# Patient Record
Sex: FEMALE | Race: WHITE | Employment: OTHER | URBAN - METROPOLITAN AREA
[De-identification: names, ages, dates, MRNs, and addresses within clinical notes are randomized per-mention and may not be internally consistent; named-entity substitution may affect disease eponyms.]

---

## 2017-01-16 ENCOUNTER — OFFICE VISIT (OUTPATIENT)
Dept: CARDIOLOGY CLINIC | Age: 82
End: 2017-01-16

## 2017-01-16 DIAGNOSIS — Z95.0 CARDIAC PACEMAKER IN SITU: Primary | ICD-10-CM

## 2017-04-18 ENCOUNTER — OFFICE VISIT (OUTPATIENT)
Dept: CARDIOLOGY CLINIC | Age: 82
End: 2017-04-18

## 2017-04-18 DIAGNOSIS — Z95.0 CARDIAC PACEMAKER IN SITU: Primary | ICD-10-CM

## 2017-04-23 RX ORDER — HYDROCHLOROTHIAZIDE 12.5 MG/1
CAPSULE ORAL
Qty: 90 CAP | Refills: 0 | Status: SHIPPED | OUTPATIENT
Start: 2017-04-23 | End: 2017-07-22 | Stop reason: SDUPTHER

## 2017-04-26 RX ORDER — MEMANTINE HYDROCHLORIDE 28 MG/1
CAPSULE, EXTENDED RELEASE ORAL
Qty: 90 CAP | Refills: 0 | Status: SHIPPED | OUTPATIENT
Start: 2017-04-26 | End: 2017-07-25 | Stop reason: SDUPTHER

## 2017-05-04 RX ORDER — DONEPEZIL HYDROCHLORIDE 10 MG/1
TABLET, FILM COATED ORAL
Qty: 90 TAB | Refills: 0 | Status: SHIPPED | OUTPATIENT
Start: 2017-05-04 | End: 2017-08-02 | Stop reason: SDUPTHER

## 2017-05-24 ENCOUNTER — OFFICE VISIT (OUTPATIENT)
Dept: NEUROLOGY | Age: 82
End: 2017-05-24

## 2017-05-24 VITALS
DIASTOLIC BLOOD PRESSURE: 62 MMHG | WEIGHT: 111.6 LBS | OXYGEN SATURATION: 97 % | SYSTOLIC BLOOD PRESSURE: 104 MMHG | BODY MASS INDEX: 17.52 KG/M2 | TEMPERATURE: 97.8 F | RESPIRATION RATE: 16 BRPM | HEART RATE: 83 BPM | HEIGHT: 67 IN

## 2017-05-24 DIAGNOSIS — F02.80 ALZHEIMER'S DISEASE OF OTHER ONSET WITHOUT BEHAVIORAL DISTURBANCE: Primary | ICD-10-CM

## 2017-05-24 DIAGNOSIS — G30.8 ALZHEIMER'S DISEASE OF OTHER ONSET WITHOUT BEHAVIORAL DISTURBANCE: Primary | ICD-10-CM

## 2017-05-24 NOTE — PROGRESS NOTES
Follow up for memory loss. Cousin in with patient and reports significant decline in patient's memory since last visit. Patient had 11 lb weight loss since last visit.

## 2017-05-24 NOTE — MR AVS SNAPSHOT
Visit Information Date & Time Provider Department Dept. Phone Encounter #  
 5/24/2017 11:20 AM Craig Saucedo MD [facility] [id number] Follow-up Instructions Return in about 6 months (around 11/24/2017). Your Appointments 7/28/2017 11:00 AM  
PACEMAKER with PACEMAKER3 [facility] (LAUREN SCHEDULING) Appt Note: bio threshold/rc/Jewel 1 yr b  new   
 [address] [place] [address]  
[address] [facility]  
  
    
 7/28/2017 11:20 AM  
ESTABLISHED PATIENT with Jossue Cortez MD  
[facility] ([address]) Appt Note: bio threshold/rc/Jewel 1 yr b  new   
 [address] [place] [address]  
[address] [facility] [id number] Upcoming Health Maintenance Date Due DTaP/Tdap/Td series (1 - Tdap) 7/3/1954 Pneumococcal 65+ Low/Medium Risk (2 of 2 - PPSV23) 1/2/2005 GLAUCOMA SCREENING Q2Y 9/1/2015 MEDICARE YEARLY EXAM 4/22/2017 INFLUENZA AGE 9 TO ADULT 8/1/2017 Allergies as of 5/24/2017  Review Complete On: 5/24/2017 By: Craig Saucedo MD  
 No Known Allergies Current Immunizations  Reviewed on 2/27/2014 Name Date Influenza Vaccine Split 11/17/2011 Pneumococcal Vaccine (Unspecified Type) 1/2/2000 TB Skin Test (PPD) Intradermal 12/16/2013 Zoster 1/2/2010 Not reviewed this visit Vitals BP Pulse Temp Resp Height(growth percentile) Weight(growth percentile) 104/62 83 97.8 °F (36.6 °C) 16 5' 7\" (1.702 m) 111 lb 9.6 oz (50.6 kg) SpO2 BMI OB Status Smoking Status 97% 17.48 kg/m2 Postmenopausal Former Smoker Vitals History BMI and BSA Data Body Mass Index Body Surface Area  
 17.48 kg/m 2 1.55 m 2 [phone] Your Updated Medication List  
  
   
This list is accurate as of: 5/24/17 11:58 AM.  Always use your most recent med list.  
  
  
  
  
 aspirin 81 mg tablet Take 81 mg by mouth daily. donepezil 10 mg tablet Commonly known as:  ARICEPT  
TAKE 1 TABLET DAILY  
  
 hydroCHLOROthiazide 12.5 mg capsule Commonly known as:  Loan Greener TAKE 1 CAPSULE DAILY  
  
 lisinopril 20 mg tablet Commonly known as:  PRINIVIL, ZESTRIL  
TAKE ONE TABLET EVERY DAY  
  
 NAMENDA XR 28 mg capsule Generic drug:  memantine ER  
TAKE 1 CAPSULE DAILY Follow-up Instructions Return in about 6 months (around 11/24/2017). Patient Instructions Information Regarding Testing If you have physican order for a test or a medication denied by your insurance company, this does not mean the test or medication is not appropriate for you as that is a medical decision, not a decision to be made by an insurance company representative or by an [facility] physician who has not interviewed and examined you. This is a decision to be made between you and your physician. The denial of services is a contractual matter between you and your insurance company, not an issue between your physician and the insurance company. If your test or medication is denied, you can take the following steps to help resolve the issue: 1. File a complaint with the [facility] regarding your insurance company's denial of services ordered for you. You can do this either by calling them directly or by completing an on-line complaint form on the [employer]You[employer]. This can be found at www.[place].gov 2.    Also file a formal complaint with your insurance company and ask to have the name of the person denying the service so that you may explore a legal option should you be harmed by this denial of service. Again, the fact the insurance company will not pay for the service does not mean it is not medically necessary and I would encourage you to follow through with the plan that was made with your physician 3. File a written complaint with your employer so your employer and benefit manager is aware of the poor coverage they are providing their employees. If you have medicare/medicaid, complain to your representative in the House and to your Kerri Foley. PRESCRIPTION REFILL POLICY [address] [facility] Statement to Patients April 1, 2014 In an effort to ensure the large volume of patient prescription refills is processed in the most efficient and expeditious manner, we are asking our patients to assist us by calling your Pharmacy for all prescription refills, this will include also your  Mail Order Pharmacy. The pharmacy will contact our office electronically to continue the refill process. Please do not wait until the last minute to call your pharmacy. We need at least 48 hours (2days) to fill prescriptions. We also encourage you to call your pharmacy before going to  your prescription to make sure it is ready. With regard to controlled substance prescription refill requests (narcotic refills) that need to be picked up at our office, we ask your cooperation by providing us with at least 72 hours (3days) notice that you will need a refill. We will not refill narcotic prescription refill requests after 4:00pm on any weekday, Monday through Thursday, or after 2:00pm on Fridays, or on the weekends. We encourage everyone to explore another way of getting your prescription refill request processed using [employer], our patient web portal through our electronic medical record system.  MuseStormt is an efficient and effective way to communicate your medication request directly to the office and downloadable as an anselmo on your smart phone . [employer] also features a review functionality that allows you to view your medication list as well as leave messages for your physician. Are you ready to get connected? If so please review the attatched instructions or speak to any of our staff to get you set up right away! Thank you so much for your cooperation. Should you have any questions please contact our Practice Administrator. The Physicians and Staff,  [facility] Neurology [facility] If we have ordered testing for you, we do not call patients with results and we do not give test results over the phone. We schedule follow up appointments so that your results can be discussed in person and any questions you have regarding them may be addressed. If something of concern is revealed on your test, we will call you for a sooner follow up appointment. Additionally, results may be found by using the My Chart feature and one of our patient service representatives at the  can give you instructions on how to access this feature of our electronic medical record system. Angus Alcocer 1973 What is a living will? A living will is a legal form you use to write down the kind of care you want at the end of your life. It is used by the health professionals who will treat you if you aren't able to decide for yourself. If you put your wishes in writing, your loved ones and others will know what kind of care you want. They won't need to guess. This can ease your mind and be helpful to others. A living will is not the same as an estate or property will. An estate will explains what you want to happen with your money and property after you die. Is a living will a legal document? A living will is a legal document. Each state has its own laws about living prado. If you move to another state, make sure that your living will is legal in the state where you now live.  Or you might use a universal form that has been approved by many states. This kind of form can sometimes be completed and stored online. Your electronic copy will then be available wherever you have a connection to the Internet. In most cases, doctors will respect your wishes even if you have a form from a different state. · You don't need an  to complete a living will. But legal advice can be helpful if your state's laws are unclear, your health history is complicated, or your family can't agree on what should be in your living will. · You can change your living will at any time. Some people find that their wishes about end-of-life care change as their health changes. · In addition to making a living will, think about completing a medical power of  form. This form lets you name the person you want to make end-of-life treatment decisions for you (your \"health care agent\") if you're not able to. Many hospitals and nursing homes will give you the forms you need to complete a living will and a medical power of . · Your living will is used only if you can't make or communicate decisions for yourself anymore. If you become able to make decisions again, you can accept or refuse any treatment, no matter what you wrote in your living will. · Your state may offer an online registry. This is a place where you can store your living will online so the doctors and nurses who need to treat you can find it right away. What should you think about when creating a living will? Talk about your end-of-life wishes with your family members and your doctor. Let them know what you want. That way the people making decisions for you won't be surprised by your choices. Think about these questions as you make your living will: · Do you know enough about life support methods that might be used?  If not, talk to your doctor so you know what might be done if you can't breathe on your own, your heart stops, or you're unable to swallow. · What things would you still want to be able to do after you receive life-support methods? Would you want to be able to walk? To speak? To eat on your own? To live without the help of machines? · If you have a choice, where do you want to be cared for? In your home? At a hospital or nursing home? · Do you want certain Roman Catholic practices performed if you become very ill? · If you have a choice at the end of your life, where would you prefer to die? At home? In a hospital or nursing home? Somewhere else? · Would you prefer to be buried or cremated? · Do you want your organs to be donated after you die? What should you do with your living will? · Make sure that your family members and your health care agent have copies of your living will. · Give your doctor a copy of your living will to keep in your medical record. If you have more than one doctor, make sure that each one has a copy. · You may want to put a copy of your living will where it can be easily found. Where can you learn more? Go to http://rogelio-asa.info/. Enter H821 in the search box to learn more about \"Learning About Living Flaquito Taylor. \" Current as of: February 24, 2016 Content Version: 11.2 © 1781-1575 [employer]. Care instructions adapted under license by [employer] (which disclaims liability or warranty for this information). If you have questions about a medical condition or this instruction, always ask your healthcare professional. [place] [address] any warranty or liability for your use of this information. Introducing [facility]! Dear Dorothy Schwartz: Thank you for requesting a [employer] account. Our records indicate that you have previously registered for a [employer] account but its currently inactive. Please call our [employer] support line at [phone]. Additional Information If you have questions, please visit the Frequently Asked Questions section of the Teamlyhart website at [email or web address] Remember, [employer] is NOT to be used for urgent needs. For medical emergencies, dial 911. Now available from your iPhone and Android! Please provide this summary of care documentation to your next provider. Your primary care clinician is listed as Fern Morrison. If you have any questions after today's visit, please call [phone].

## 2017-05-24 NOTE — PROGRESS NOTES
[facility]   [place] [address]   Aisha Phipps 57    [facility]    Fax               No chief complaint on file. Current Outpatient Prescriptions   Medication Sig Dispense Refill    donepezil (ARICEPT) 10 mg tablet TAKE 1 TABLET DAILY 90 Tab 0    NAMENDA XR 28 mg capsule TAKE 1 CAPSULE DAILY 90 Cap 0    hydroCHLOROthiazide (MICROZIDE) 12.5 mg capsule TAKE 1 CAPSULE DAILY 90 Cap 0    lisinopril (PRINIVIL, ZESTRIL) 20 mg tablet TAKE ONE TABLET EVERY DAY 90 Tab 1    aspirin 81 mg tablet Take 81 mg by mouth daily. No Known Allergies  Social History   Substance Use Topics    Smoking status: Former Smoker     Quit date: 11/17/1975    Smokeless tobacco: Never Used    Alcohol use 7.0 oz/week     14 Glasses of wine per week     Patient returns today for follow-up dementia, Alzheimer's type. She has been maintained on Namenda as well as Aricept. She has been tolerating her medications without difficulty. There have been no dangerous behaviors. She continues to walk the grounds of the facility. She is not eating much. She actually orders half portions of her meals. She says that she just is not hungry. Her niece is with her. She has noticed a dramatic decline. There is been no fall. There is been no injury. The patient has withdrawn herself from her longtime friends from the [address] [place] [address] Str.. Examination  Visit Vitals    Resp 16    Ht 5' 7\" (1.702 m)    Wt 50.6 kg (111 lb 9.6 oz)    BMI 17.48 kg/m2     She is pleasant. She is very well dressed. She has quite appropriate grooming. She has no icterus. No edema. She is oriented to May 25, 2017. She says the president is Trump. She follows commands. She will repeat the same things several times. Impression/Plan  Dementia of the Alzheimer's type the continues to decline. Discussed today with the patient as well as her niece the continued decline. Discussed strategies to try to get her to eat more. Her niece will be going to get snacks etc. for the patient to keep in her room. She does stay physically active walking about the campus most days. We discussed strategies but most of all expectations and that it is not out of the ordinary to see that the stabilization we were enjoying with the medications is now gone into a decline. Answered the niece's questions today and tried to explain this as best I could and to really get her to understand that there is not really anything else we can do. Continue with current regimen    Total time: 30 min   Counseling / coordination time: 20 min   > 50% counseling / coordination?: Yes re: as documented    This note was created using voice recognition software. Despite editing, there may be syntax errors. This note will not be viewable in [address]

## 2017-05-24 NOTE — PATIENT INSTRUCTIONS
Information Regarding Testing     If you have physican order for a test or a medication denied by your insurance company, this does not mean the test or medication is not appropriate for you as that is a medical decision, not a decision to be made by an insurance company representative or by an [facility] physician who has not interviewed and examined you. This is a decision to be made between you and your physician. The denial of services is a contractual matter between you and your insurance company, not an issue between your physician and the insurance company. If your test or medication is denied, you can take the following steps to help resolve the issue:    1. File a complaint with the [facility] regarding your insurance company's denial of services ordered for you. You can do this either by calling them directly or by completing an on-line complaint form on the [employer]. This can be found at [email or web address]    2. Also file a formal complaint with your insurance company and ask to have the name of the person denying the service so that you may explore a legal option should you be harmed by this denial of service. Again, the fact the insurance company will not pay for the service does not mean it is not medically necessary and I would encourage you to follow through with the plan that was made with your physician    3. File a written complaint with your employer so your employer and benefit manager is aware of the poor coverage they are providing their employees. If you have medicare/medicaid, complain to your representative in the House and to your Kerri Foley.     [facility]   Statement to Patients  April 1, 2014      In an effort to ensure the large volume of patient prescription refills is processed in the most efficient and expeditious manner, we are asking our patients to assist us by calling your Pharmacy for all prescription refills, this will include also your  Mail Order Pharmacy. The pharmacy will contact our office electronically to continue the refill process. Please do not wait until the last minute to call your pharmacy. We need at least 48 hours (2days) to fill prescriptions. We also encourage you to call your pharmacy before going to  your prescription to make sure it is ready. With regard to controlled substance prescription refill requests (narcotic refills) that need to be picked up at our office, we ask your cooperation by providing us with at least 72 hours (3days) notice that you will need a refill. We will not refill narcotic prescription refill requests after 4:00pm on any weekday, Monday through Thursday, or after 2:00pm on Fridays, or on the weekends. We encourage everyone to explore another way of getting your prescription refill request processed using Gr[employer], our patient web portal through our electronic medical record system. Grou[employer]Y is an efficient and effective way to communicate your medication request directly to the office and  downloadable as an anselmo on your smart phone . [employer] also features a review functionality that allows you to view your medication list as well as leave messages for your physician. Are you ready to get connected? If so please review the attatched instructions or speak to any of our staff to get you set up right away! Thank you so much for your cooperation. Should you have any questions please contact our Practice Administrator. The Physicians and Staff,  [facility]       If we have ordered testing for you, we do not call patients with results and we do not give test results over the phone. We schedule follow up appointments so that your results can be discussed in person and any questions you have regarding them may be addressed.   If something of concern is revealed on your test, we will call you for a sooner follow up appointment. Additionally, results may be found by using the My Chart feature and one of our patient service representatives at the  can give you instructions on how to access this feature of our electronic medical record system. Learning About Living Corina Wheeler  What is a living will? A living will is a legal form you use to write down the kind of care you want at the end of your life. It is used by the health professionals who will treat you if you aren't able to decide for yourself. If you put your wishes in writing, your loved ones and others will know what kind of care you want. They won't need to guess. This can ease your mind and be helpful to others. A living will is not the same as an estate or property will. An estate will explains what you want to happen with your money and property after you die. Is a living will a legal document? A living will is a legal document. Each state has its own laws about living prado. If you move to another state, make sure that your living will is legal in the state where you now live. Or you might use a universal form that has been approved by many states. This kind of form can sometimes be completed and stored online. Your electronic copy will then be available wherever you have a connection to the Internet. In most cases, doctors will respect your wishes even if you have a form from a different state. · You don't need an  to complete a living will. But legal advice can be helpful if your state's laws are unclear, your health history is complicated, or your family can't agree on what should be in your living will. · You can change your living will at any time. Some people find that their wishes about end-of-life care change as their health changes. · In addition to making a living will, think about completing a medical power of  form.  This form lets you name the person you want to make end-of-life treatment decisions for you (your \"health care agent\") if you're not able to. Many hospitals and nursing homes will give you the forms you need to complete a living will and a medical power of . · Your living will is used only if you can't make or communicate decisions for yourself anymore. If you become able to make decisions again, you can accept or refuse any treatment, no matter what you wrote in your living will. · Your state may offer an online registry. This is a place where you can store your living will online so the doctors and nurses who need to treat you can find it right away. What should you think about when creating a living will? Talk about your end-of-life wishes with your family members and your doctor. Let them know what you want. That way the people making decisions for you won't be surprised by your choices. Think about these questions as you make your living will:  · Do you know enough about life support methods that might be used? If not, talk to your doctor so you know what might be done if you can't breathe on your own, your heart stops, or you're unable to swallow. · What things would you still want to be able to do after you receive life-support methods? Would you want to be able to walk? To speak? To eat on your own? To live without the help of machines? · If you have a choice, where do you want to be cared for? In your home? At a hospital or nursing home? · Do you want certain Lutheran practices performed if you become very ill? · If you have a choice at the end of your life, where would you prefer to die? At home? In a hospital or nursing home? Somewhere else? · Would you prefer to be buried or cremated? · Do you want your organs to be donated after you die? What should you do with your living will? · Make sure that your family members and your health care agent have copies of your living will. · Give your doctor a copy of your living will to keep in your medical record.  If you have more than one doctor, make sure that each one has a copy. · You may want to put a copy of your living will where it can be easily found. Where can you learn more? Go to http://rogelio-asa.info/. Enter L458 in the search box to learn more about \"Learning About Living [place]. \"  Current as of: February 24, 2016  Content Version: 11.2  © 8906-3282 [employer]eedMe. Care instructions adapted under license by GuestSpan (which disclaims liability or warranty for this information). If you have questions about a medical condition or this instruction, always ask your healthcare professional. [address] any warranty or liability for your use of this information.

## 2017-05-26 RX ORDER — LISINOPRIL 20 MG/1
TABLET ORAL
Qty: 90 TAB | Refills: 0 | Status: SHIPPED | OUTPATIENT
Start: 2017-05-26 | End: 2017-08-24 | Stop reason: SDUPTHER

## 2017-07-22 RX ORDER — HYDROCHLOROTHIAZIDE 12.5 MG/1
CAPSULE ORAL
Qty: 90 CAP | Refills: 1 | Status: SHIPPED | OUTPATIENT
Start: 2017-07-22 | End: 2018-01-21 | Stop reason: SDUPTHER

## 2017-07-25 RX ORDER — MEMANTINE HYDROCHLORIDE 28 MG/1
CAPSULE, EXTENDED RELEASE ORAL
Qty: 90 CAP | Refills: 3 | Status: SHIPPED | OUTPATIENT
Start: 2017-07-25

## 2017-08-04 RX ORDER — DONEPEZIL HYDROCHLORIDE 10 MG/1
TABLET, FILM COATED ORAL
Qty: 90 TAB | Refills: 0 | Status: SHIPPED | OUTPATIENT
Start: 2017-08-04 | End: 2017-11-02 | Stop reason: SDUPTHER

## 2017-08-24 RX ORDER — LISINOPRIL 20 MG/1
TABLET ORAL
Qty: 90 TAB | Refills: 1 | Status: SHIPPED | OUTPATIENT
Start: 2017-08-24

## 2017-08-29 ENCOUNTER — OFFICE VISIT (OUTPATIENT)
Dept: CARDIOLOGY CLINIC | Age: 82
End: 2017-08-29

## 2017-08-29 ENCOUNTER — CLINICAL SUPPORT (OUTPATIENT)
Dept: CARDIOLOGY CLINIC | Age: 82
End: 2017-08-29

## 2017-08-29 VITALS
DIASTOLIC BLOOD PRESSURE: 60 MMHG | HEART RATE: 76 BPM | BODY MASS INDEX: 17.67 KG/M2 | SYSTOLIC BLOOD PRESSURE: 122 MMHG | WEIGHT: 112.8 LBS

## 2017-08-29 DIAGNOSIS — Z95.0 CARDIAC PACEMAKER IN SITU: ICD-10-CM

## 2017-08-29 DIAGNOSIS — I10 ESSENTIAL HYPERTENSION: ICD-10-CM

## 2017-08-29 DIAGNOSIS — Z95.0 CARDIAC PACEMAKER IN SITU: Primary | ICD-10-CM

## 2017-08-29 DIAGNOSIS — I44.2 THIRD DEGREE ATRIOVENTRICULAR BLOCK (HCC): Primary | ICD-10-CM

## 2017-08-29 NOTE — PROGRESS NOTES
HISTORY OF PRESENT ILLNESS  Jason Red is a 80 y.o. female     SUMMARY:   Problem List  Date Reviewed: 8/28/2017          Codes Class Noted    Alzheimer's disease ICD-10-CM: G30.9  ICD-9-CM: 331.0  4/26/2014        S/P placement of cardiac pacemaker ICD-10-CM: Z95.0  ICD-9-CM: V45.01  5/28/2013    Overview Addendum 3/30/2015  7:44 AM by Ramírez Albarado MD     5/28/13 dual chamber Biotronik pacemaker  5/13 echo normal lvef, lae, mild mr, tr             Third degree atrioventricular block ([address] ICD-10-CM: I44.2  ICD-9-CM: 426.0  5/28/2013        H/O seasonal allergies ICD-10-CM: Z88.9  ICD-9-CM: V15.09  5/17/2013        Memory loss ICD-10-CM: R41.3  ICD-9-CM: 780.93  8/2/2012        Essential hypertension ICD-10-CM: I10  ICD-9-CM: 401.9  11/17/2011        Family history of malignant neoplasm of colon ICD-10-CM: Z80.0  ICD-9-CM: V16.0  11/17/2011              Current Outpatient Prescriptions on File Prior to Visit   Medication Sig    lisinopril (PRINIVIL, ZESTRIL) 20 mg tablet TAKE 1 TABLET DAILY    donepezil (ARICEPT) 10 mg tablet TAKE 1 TABLET DAILY    NAMENDA XR 28 mg capsule TAKE 1 CAPSULE DAILY    hydroCHLOROthiazide (MICROZIDE) 12.5 mg capsule TAKE 1 CAPSULE DAILY    aspirin 81 mg tablet Take 81 mg by mouth daily. No current facility-administered medications on file prior to visit. CARDIOLOGY STUDIES TO DATE:  5/28/13 dual chamber Biotronik pacemaker  5/13 echo normal lvef, lae, mild mr, tr        Chief Complaint   Patient presents with    Hypertension     HPI :  Ms. Villa Carlos is doing well. She is still very active walking at [facility] with no worrisome cardiac symptoms. She is not eating well, says that is because of her teeth, and she has lost about ten pounds since we saw her last July. She had a pacemaker check today.      CARDIAC ROS:   negative for chest pain, dyspnea, palpitations, syncope, orthopnea, paroxysmal nocturnal dyspnea, exertional chest pressure/discomfort, claudication, lower extremity edema    Family History   Problem Relation Age of Onset    Cancer Mother     Heart Disease Mother     Hypertension Mother     Stroke Mother     Diabetes Father     Cancer Father     Heart Disease Father     Psychiatric Disorder Brother     Alcohol abuse Paternal Uncle        Past Medical History:   Diagnosis Date    Abnormal weight loss     Allergic rhinitis     Cancer ([address]     skin    Dementia 8/15/2013    Family history of malignant neoplasm of colon 11/17/2011    H/O seasonal allergies 5/17/2013    Hypertension     Memory disorder     Pacemaker        GENERAL ROS:  A comprehensive review of systems was negative except for that written in the HPI. Visit Vitals    /60 (BP 1 Location: Left arm, BP Patient Position: Sitting)    Pulse 76    Wt 112 lb 12.8 oz (51.2 kg)    BMI 17.67 kg/m2       Wt Readings from Last 3 Encounters:   08/29/17 112 lb 12.8 oz (51.2 kg)   05/24/17 111 lb 9.6 oz (50.6 kg)   09/08/16 122 lb 8 oz (55.6 kg)            BP Readings from Last 3 Encounters:   08/29/17 122/60   05/24/17 104/62   09/08/16 110/64       PHYSICAL EXAM  General appearance: alert, cooperative, no distress, appears stated age  Neck: supple, symmetrical, trachea midline, no adenopathy, no carotid bruit and no JVD  Lungs: clear to auscultation bilaterally  Heart: regular rate and rhythm, S1, S2 normal, no murmur, click, rub or gallop  Extremities: extremities normal, atraumatic, no cyanosis or edema    Lab Results   Component Value Date/Time    Cholesterol, total 226 11/28/2011 08:55 AM    HDL Cholesterol 75 11/28/2011 08:55 AM    LDL, calculated 132 11/28/2011 08:55 AM    Triglyceride 95 11/28/2011 08:55 AM     ASSESSMENT  Ms. Sandrine Paula is stable, asymptomatic and well-compensated on a good medical regimen and needs no cardiac testing at this time.       current treatment plan is effective, no change in therapy  lab results and schedule of future lab studies reviewed with patient  reviewed diet, exercise and weight control    Encounter Diagnoses   Name Primary?  Third degree atrioventricular block ([address] Yes    Essential hypertension     Cardiac pacemaker in situ      No orders of the defined types were placed in this encounter. Follow-up Disposition:  Return in about 1 year (around 8/29/2018).     Seun Crawford MD  8/29/2017

## 2017-08-29 NOTE — MR AVS SNAPSHOT
Visit Information Date & Time Provider Department Dept. Phone Encounter #  
 8/29/2017  2:40 PM Amita Mcneill MD [facility] [phone] [id number] Follow-up Instructions Return in about 1 year (around 8/29/2018). Your Appointments 8/29/2017  2:15 PM  
PACEMAKER with PACEMAKER3 [facility] ([facility]UREN SCHEDULING) Appt Note: bio threshold/rc/Jewel 1 yr b  new HM; bio threshold/rc/Jewel 1 yr b new HM pt rs 7.28.17 [address] [place] [address]  
[address] [facility]  
  
    
 8/29/2017  2:40 PM  
ESTABLISHED PATIENT with Amita Mcneill MD  
[facility] ([address]) Appt Note: bio threshold/rc/Jewel 1 yr b new HM pt rs 7.28.17 [address] [place] [address] [facility] [address]  
[address] [address]  
  
    
 12/6/2017  9:40 AM  
Follow Up with Roe Lam MD  
[facility]) Appt Note: memory loss brook  
 [facility] [address] Counts include 234 beds at the [facility] [address] [id number] [phone]  
  
   
 [facility]  
  
    
  
 12/6/2017 10:00 AM  
REMOTE OFFICE VISIT with Yuriy Lubin [facility] (LAUREN SCHEDULING) Appt Note: bio ppi, rc, b 8/29/17  
 [address] [facility] [address] [place] [address]  
[address] [address]  
  
    
 3/14/2018  9:00 AM  
REMOTE OFFICE VISIT with Yuriy Lubin [facility] ([facility]UREN SCHEDULING) Appt Note: bio ppi, rc  
 [address] [facility] [address] [place] [address]  
[phone]  
  
    
 6/20/2018  9:15 AM  
REMOTE OFFICE VISIT with Yuriy Lubin [facility] ([facility] SCHEDULING) Appt Note: bio ppi, rc  
 [address] [facility]  
[phone] Upcoming Health Maintenance Date Due DTaP/Tdap/Td series (1 - Tdap) 7/3/1954 Pneumococcal 65+ Low/Medium Risk (2 of 2 - PPSV23) 1/2/2005 GLAUCOMA SCREENING Q2Y 9/1/2015 MEDICARE YEARLY EXAM 4/22/2017 INFLUENZA AGE 9 TO ADULT 8/1/2017 Allergies as of 8/29/2017  Review Complete On: 8/29/2017 By: Rebbeca Lesch, MD  
 No Known Allergies Current Immunizations  Reviewed on 2/27/2014 Name Date Influenza Vaccine Split 11/17/2011 TB Skin Test (PPD) Intradermal 12/16/2013 ZZZ-RETIRED (DO NOT USE) Pneumococcal Vaccine (Unspecified Type) 1/2/2000 Zoster 1/2/2010 Not reviewed this visit You Were Diagnosed With   
  
 Codes Comments Third degree atrioventricular block (HCC)    -  Primary ICD-10-CM: C15.0 ICD-9-CM: 426.0 Essential hypertension     ICD-10-CM: I10 
ICD-9-CM: 401.9 Cardiac pacemaker in situ     ICD-10-CM: Z95.0 ICD-9-CM: V45.01 Vitals BP Pulse Weight(growth percentile) BMI OB Status Smoking Status 122/60 (BP 1 Location: Left arm, BP Patient Position: Sitting) 76 112 lb 12.8 oz (51.2 kg) 17.67 kg/m2 Postmenopausal Former Smoker Vitals History BMI and BSA Data Body Mass Index Body Surface Area  
 17.67 kg/m 2 1.56 m 2 Preferred Pharmacy Pharmacy Name Phone [address] [facility] [phone] Your Updated Medication List  
  
   
This list is accurate as of: 8/29/17  2:12 PM.  Always use your most recent med list.  
  
  
  
  
 aspirin 81 mg tablet Take 81 mg by mouth daily. donepezil 10 mg tablet Commonly known as:  ARICEPT  
TAKE 1 TABLET DAILY  
  
 hydroCHLOROthiazide 12.5 mg capsule Commonly known as:  Louis Devoid TAKE 1 CAPSULE DAILY  
  
 lisinopril 20 mg tablet Commonly known as:  PRINIVIL, ZESTRIL  
TAKE 1 TABLET DAILY NAMENDA XR 28 mg capsule Generic drug:  memantine ER  
TAKE 1 CAPSULE DAILY Follow-up Instructions Return in about 1 year (around 8/29/2018). Introducing [facility] SERVICES! Dear Kojo Hargrove: Thank you for requesting a Goodpatch account. Our records indicate that you have previously registered for a Goodpatch account but its currently inactive. Please call our Goodpatch support line at [phone]. Additional Information If you have questions, please visit the Frequently Asked Questions section of the Goodpatch website at [email or web address] Remember, Goodpatch is NOT to be used for urgent needs. For medical emergencies, dial 911. Now available from your iPhone and Android! Please provide this summary of care documentation to your next provider. Your primary care clinician is listed as Fred Aleman. If you have any questions after today's visit, please call [phone].

## 2017-11-02 RX ORDER — DONEPEZIL HYDROCHLORIDE 10 MG/1
TABLET, FILM COATED ORAL
Qty: 90 TAB | Refills: 0 | Status: SHIPPED | OUTPATIENT
Start: 2017-11-02

## 2017-12-06 ENCOUNTER — OFFICE VISIT (OUTPATIENT)
Dept: CARDIOLOGY CLINIC | Age: 82
End: 2017-12-06

## 2017-12-06 DIAGNOSIS — Z95.0 CARDIAC PACEMAKER IN SITU: Primary | ICD-10-CM

## 2017-12-13 ENCOUNTER — TELEPHONE (OUTPATIENT)
Dept: INTERNAL MEDICINE CLINIC | Age: 82
End: 2017-12-13

## 2017-12-13 NOTE — TELEPHONE ENCOUNTER
Joana Cid called stating that pt is having some memory lost and cognitive issues. Can pts has order for neuro/pysch eval. To help with pts asst living arrangements.      D:[phone]    Call back if neded

## 2017-12-15 NOTE — TELEPHONE ENCOUNTER
Fax sent to [facility] per Dr Hannah Caban of the following:  She is followed by Diane Kim (neurology) for dementia. He has done all the evaluations and refers any questions about this to him.

## 2017-12-19 ENCOUNTER — TELEPHONE (OUTPATIENT)
Dept: NEUROLOGY | Age: 82
End: 2017-12-19

## 2017-12-19 NOTE — TELEPHONE ENCOUNTER
----- Message from Geoff Morel sent at 12/19/2017 10:58 AM EST -----  Regarding: Dr. Morenita Ashley, a nurse from [facility], called stating that the pt's [address] wants the pt's to have a neuro physic test done there. Mrs. Shanice Echeverria is requesting for Dr. Leah Roth to send a order over so the pt can have it done at [facility] and they need it by 12-. Also Mrs. Shanice Echeverria is waiting on a response on the paperwork they sent over regarding the pt. Mrs. Shanika Vanegas best contact number is [phone], fax [phone].

## 2017-12-19 NOTE — TELEPHONE ENCOUNTER
Message not returned as caller or unknown POA not on HIPPA. Hortencia Munoz with neuropsych has paper work they are referring to and will handle this message.

## 2017-12-21 ENCOUNTER — TELEPHONE (OUTPATIENT)
Dept: NEUROLOGY | Age: 82
End: 2017-12-21

## 2017-12-21 NOTE — TELEPHONE ENCOUNTER
Mya from [employer]  is calling to get a order for a  Hanna psych  Exam  at the home by dr Michelle Galicia    Patient's family is requesting it to be done at the home   Fax order to  [phone]

## 2018-01-16 ENCOUNTER — TELEPHONE (OUTPATIENT)
Dept: NEUROLOGY | Age: 83
End: 2018-01-16

## 2018-01-16 NOTE — TELEPHONE ENCOUNTER
Patient's cousin Lianna Pino called (HIPAA verified) requesting order for psychological eval (cognitive concern) be faxed over to [facility] as soon as possible. Cousin stated nurse from [facility] has called on multiple occasions to have order sent but order has not been received. Patient's eval is scheduled for 01/24/18. Please fax order to [phone]. Make attn. Toby Marion, nurse at [facility].       Contact Phone numbers: [phone] [facility] phone)                                            [phone] (patient's cousin)

## 2018-01-18 NOTE — TELEPHONE ENCOUNTER
Message left for Mrs Shakira Doe with providers reply.   Pt not seen since 5/17   I do not see where I have ordered repeat NEuropsych   Perhaps Kristie should call the MD who ordered such eval.

## 2018-01-21 RX ORDER — HYDROCHLOROTHIAZIDE 12.5 MG/1
CAPSULE ORAL
Qty: 90 CAP | Refills: 1 | Status: SHIPPED | OUTPATIENT
Start: 2018-01-21

## 2018-01-22 ENCOUNTER — OFFICE VISIT (OUTPATIENT)
Dept: NEUROLOGY | Age: 83
End: 2018-01-22

## 2018-01-22 VITALS
BODY MASS INDEX: 16.01 KG/M2 | SYSTOLIC BLOOD PRESSURE: 118 MMHG | OXYGEN SATURATION: 99 % | RESPIRATION RATE: 17 BRPM | DIASTOLIC BLOOD PRESSURE: 76 MMHG | WEIGHT: 102 LBS | HEART RATE: 85 BPM | HEIGHT: 67 IN

## 2018-01-22 DIAGNOSIS — R41.3 MEMORY LOSS: ICD-10-CM

## 2018-01-22 DIAGNOSIS — G30.8 ALZHEIMER'S DISEASE OF OTHER ONSET WITHOUT BEHAVIORAL DISTURBANCE: Primary | ICD-10-CM

## 2018-01-22 DIAGNOSIS — F02.80 ALZHEIMER'S DISEASE OF OTHER ONSET WITHOUT BEHAVIORAL DISTURBANCE: Primary | ICD-10-CM

## 2018-01-22 NOTE — PATIENT INSTRUCTIONS
Information Regarding Testing     If you have physican order for a test or a medication denied by your insurance company, this does not mean the test or medication is not appropriate for you as that is a medical decision, not a decision to be made by an insurance company representative or by an [facility] physician who has not interviewed and examined you. This is a decision to be made between you and your physician. The denial of services is a contractual matter between you and your insurance company, not an issue between your physician and the insurance company. If your test or medication is denied, you can take the following steps to help resolve the issue:    1. File a complaint with the [facility] regarding your insurance company's denial of services ordered for you. You can do this either by calling them directly or by completing an on-line complaint form on the [employer]. This can be found at [email or web address]    2. Also file a formal complaint with your insurance company and ask to have the name of the person denying the service so that you may explore a legal option should you be harmed by this denial of service. Again, the fact the insurance company will not pay for the service does not mean it is not medically necessary and I would encourage you to follow through with the plan that was made with your physician    3. File a written complaint with your employer so your employer and benefit manager is aware of the poor coverage they are providing their employees. If you have medicare/medicaid, complain to your representative in the House and to your Kerri Foley.     [facility]   Statement to Patients  April 1, 2014      In an effort to ensure the large volume of patient prescription refills is processed in the most efficient and expeditious manner, we are asking our patients to assist us by calling your Pharmacy for all prescription refills, this will include also your  Mail Order Pharmacy. The pharmacy will contact our office electronically to continue the refill process. Please do not wait until the last minute to call your pharmacy. We need at least 48 hours (2days) to fill prescriptions. We also encourage you to call your pharmacy before going to  your prescription to make sure it is ready. With regard to controlled substance prescription refill requests (narcotic refills) that need to be picked up at our office, we ask your cooperation by providing us with at least 72 hours (3days) notice that you will need a refill. We will not refill narcotic prescription refill requests after 4:00pm on any weekday, Monday through Thursday, or after 2:00pm on Fridays, or on the weekends. We encourage everyone to explore another way of getting your prescription refill request processed using BULX, our patient web portal through our electronic medical record system. BULX is an efficient and effective way to communicate your medication request directly to the office and  downloadable as an anselmo on your smart phone . BULX also features a review functionality that allows you to view your medication list as well as leave messages for your physician. Are you ready to get connected? If so please review the attatched instructions or speak to any of our staff to get you set up right away! Thank you so much for your cooperation. Should you have any questions please contact our Practice Administrator. The Physicians and Staff,  [facility]     If we have ordered testing for you, we do not call patients with results and we do not give test results over the phone. We schedule follow up appointments so that your results can be discussed in person and any questions you have regarding them may be addressed.   If something of concern is revealed on your test, we will call you for a sooner follow up appointment. Additionally, results may be found by using the My Chart feature and one of our patient service representatives at the  can give you instructions on how to access this feature of our electronic medical record system. Learning About Living Davin  What is a living will? A living will is a legal form you use to write down the kind of care you want at the end of your life. It is used by the health professionals who will treat you if you aren't able to decide for yourself. If you put your wishes in writing, your loved ones and others will know what kind of care you want. They won't need to guess. This can ease your mind and be helpful to others. A living will is not the same as an estate or property will. An estate will explains what you want to happen with your money and property after you die. Is a living will a legal document? A living will is a legal document. Each state has its own laws about living prado. If you move to another state, make sure that your living will is legal in the state where you now live. Or you might use a universal form that has been approved by many states. This kind of form can sometimes be completed and stored online. Your electronic copy will then be available wherever you have a connection to the Internet. In most cases, doctors will respect your wishes even if you have a form from a different state. · You don't need an  to complete a living will. But legal advice can be helpful if your state's laws are unclear, your health history is complicated, or your family can't agree on what should be in your living will. · You can change your living will at any time. Some people find that their wishes about end-of-life care change as their health changes. · In addition to making a living will, think about completing a medical power of  form.  This form lets you name the person you want to make end-of-life treatment decisions for you (your \"health care agent\") if you're not able to. Many hospitals and nursing homes will give you the forms you need to complete a living will and a medical power of . · Your living will is used only if you can't make or communicate decisions for yourself anymore. If you become able to make decisions again, you can accept or refuse any treatment, no matter what you wrote in your living will. · Your state may offer an online registry. This is a place where you can store your living will online so the doctors and nurses who need to treat you can find it right away. What should you think about when creating a living will? Talk about your end-of-life wishes with your family members and your doctor. Let them know what you want. That way the people making decisions for you won't be surprised by your choices. Think about these questions as you make your living will:  · Do you know enough about life support methods that might be used? If not, talk to your doctor so you know what might be done if you can't breathe on your own, your heart stops, or you're unable to swallow. · What things would you still want to be able to do after you receive life-support methods? Would you want to be able to walk? To speak? To eat on your own? To live without the help of machines? · If you have a choice, where do you want to be cared for? In your home? At a hospital or nursing home? · Do you want certain Mandaeism practices performed if you become very ill? · If you have a choice at the end of your life, where would you prefer to die? At home? In a hospital or nursing home? Somewhere else? · Would you prefer to be buried or cremated? · Do you want your organs to be donated after you die? What should you do with your living will? · Make sure that your family members and your health care agent have copies of your living will. · Give your doctor a copy of your living will to keep in your medical record.  If you have more than one doctor, make sure that each one has a copy. · You may want to put a copy of your living will where it can be easily found. Where can you learn more? Go to http://rogelio-asa.info/. Enter I227 in the search box to learn more about \"Learning About Living [place]. \"  Current as of: September 24, 2016  Content Version: 11.4  © 5770-9159 [employer]. Care instructions adapted under license by [place] [facility] (which disclaims liability or warranty for this information). If you have questions about a medical condition or this instruction, always ask your healthcare professional. [address] any warranty or liability for your use of this information.

## 2018-01-22 NOTE — MR AVS SNAPSHOT
[address] [place] [address] [address] [place] [address] [address] [id number] [phone] Patient: Ольга Hampton MRN: [id number] ETH:1/2/9264 Visit Information Date & Time Provider Department Dept. Phone Encounter #  
 1/22/2018  3:00 PM [facility] [phone] [id number] Follow-up Instructions Return in about 6 months (around 7/22/2018). Your Appointments 9/25/2018  2:00 PM  
PACEMAKER with PACEM[facility] ([facility] SCHEDULING) Appt Note: bio ppi, rc, annual thresh/HM  
 [address] [facility] [address]  
[facility]  
  
    
 9/25/2018  2:20 PM  
ESTABLISHED PATIENT with Meek Eaton MD  
[facility] ([facility]) Appt Note: one year follow up  
 [address] [facility] [address]  
[address] [facility] [phone]  
  
    
  
 3/14/2018  9:00 AM  
REMOTE OFFICE VISIT with Ha Lockwood [facility] ([facility] SCHEDULING) Appt Note: bio ppi, rc  
 [address] [facility] [address] [phone]  
[facility] [phone]  
  
    
 6/20/2018  9:15 AM  
REMOTE OFFICE VISIT with Ha Lockwood [facility] ([facility] SCHEDULING) Appt Note: bio ppi, rc  
 [address] [facility] [address]  
[phone] Upcoming Health Maintenance Date Due DTaP/Tdap/Td series (1 - Tdap) [birth year]3/1954 Pneumococcal 65+ Low/Medium Risk (2 of 2 - PPSV23) 1/2/2005 GLAUCOMA SCREENING Q2Y 9/1/2015 MEDICARE YEARLY EXAM 4/22/2017 Influenza Age 5 to Adult 8/1/2017 Allergies as of 1/22/2018  Review Complete On: 1/22/2018 By: Abi Cardona LPN No Known Allergies Current Immunizations  Reviewed on 2/27/2014 Name Date Influenza Vaccine Split 11/17/2011 TB Skin Test (PPD) Intradermal 12/16/2013 ZZZ-RETIRED (DO NOT USE) Pneumococcal Vaccine (Unspecified Type) 1/2/2000 Zoster 1/2/2010 Not reviewed this visit You Were Diagnosed With   
  
 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  
 118/76 85 17 5' 7\" (1.702 m) 102 lb (46.3 kg) 99% BMI OB Status Smoking Status 15.98 kg/m2 Postmenopausal Former Smoker Vitals History BMI and BSA Data Body Mass Index Body Surface Area 15.98 kg/m 2 1.48 m 2 [facility] [phone] Your Updated Medication List  
  
   
This list is accurate as of: 1/22/18  3:48 PM.  Always use your most recent med list.  
  
  
  
  
 aspirin 81 mg tablet Take 81 mg by mouth daily. donepezil 10 mg tablet Commonly known as:  ARICEPT  
TAKE 1 TABLET DAILY  
  
 hydroCHLOROthiazide 12.5 mg capsule Commonly known as:  Catherne Selam TAKE 1 CAPSULE DAILY  
  
 lisinopril 20 mg tablet Commonly known as:  PRINIVIL, ZESTRIL  
TAKE 1 TABLET DAILY  
  
 NAMENDA XR 28 mg capsule Generic drug:  memantine ER  
TAKE 1 CAPSULE DAILY We Performed the Following REFERRAL TO NEUROPSYCHOLOGY [GTW81 Custom] Comments:  
 Dr Jessica Bauer Neuropsych eval  
  
Follow-up Instructions Return in about 6 months (around 7/22/2018). Referral Information Referral ID Referred By Referred To  
  
 [id number] JESSICA ANDREWS Not Available Visits Status Start Date End Date 1 Authorized 1/22/18 1/22/19 If your referral has a status of pending review or denied, additional information will be sent to support the outcome of this decision. Patient Instructions Information Regarding Testing If you have physican order for a test or a medication denied by your insurance company, this does not mean the test or medication is not appropriate for you as that is a medical decision, not a decision to be made by an insurance company representative or by an [facility] physician who has not interviewed and examined you. This is a decision to be made between you and your physician. The denial of services is a contractual matter between you and your insurance company, not an issue between your physician and the insurance company. If your test or medication is denied, you can take the following steps to help resolve the issue: 1. File a complaint with the [facility] of Insurance regarding your insurance company's denial of services ordered for you. You can do this either by calling them directly or by completing an on-line complaint form on the [employer] This can be found at [email or web address] 2. Also file a formal complaint with your insurance company and ask to have the name of the person denying the service so that you may explore a legal option should you be harmed by this denial of service. Again, the fact the insurance company will not pay for the service does not mean it is not medically necessary and I would encourage you to follow through with the plan that was made with your physician 3. File a written complaint with your employer so your employer and benefit manager is aware of the poor coverage they are providing their employees. If you have medicare/medicaid, complain to your representative in the House and to your Kerri Foley. PRESCRIPTION REFILL POLICY [facility] Statement to Patients April 1, 2014 In an effort to ensure the large volume of patient prescription refills is processed in the most efficient and expeditious manner, we are asking our patients to assist us by calling your Pharmacy for all prescription refills, this will include also your  Mail Order Pharmacy. The pharmacy will contact our office electronically to continue the refill process. Please do not wait until the last minute to call your pharmacy. We need at least 48 hours (2days) to fill prescriptions. We also encourage you to call your pharmacy before going to  your prescription to make sure it is ready. With regard to controlled substance prescription refill requests (narcotic refills) that need to be picked up at our office, we ask your cooperation by providing us with at least 72 hours (3days) notice that you will need a refill. We will not refill narcotic prescription refill requests after 4:00pm on any weekday, Monday through Thursday, or after 2:00pm on Fridays, or on the weekends. We encourage everyone to explore another way of getting your prescription refill request processed using PO-MO, our patient web portal through our electronic medical record system. PO-MO is an efficient and effective way to communicate your medication request directly to the office and  downloadable as an anselmo on your smart phone . PO-MO also features a review functionality that allows you to view your medication list as well as leave messages for your physician. Are you ready to get connected? If so please review the attatched instructions or speak to any of our staff to get you set up right away! Thank you so much for your cooperation. Should you have any questions please contact our Practice Administrator. The Physicians and Staff,  [facility] If we have ordered testing for you, we do not call patients with results and we do not give test results over the phone.   We schedule follow up appointments so that your results can be discussed in person and any questions you have regarding them may be addressed. If something of concern is revealed on your test, we will call you for a sooner follow up appointment. Additionally, results may be found by using the My Chart feature and one of our patient service representatives at the  can give you instructions on how to access this feature of our electronic medical record system. Angus Alcocer 17[address] What is a living will? A living will is a legal form you use to write down the kind of care you want at the end of your life. It is used by the health professionals who will treat you if you aren't able to decide for yourself. If you put your wishes in writing, your loved ones and others will know what kind of care you want. They won't need to guess. This can ease your mind and be helpful to others. A living will is not the same as an estate or property will. An estate will explains what you want to happen with your money and property after you die. Is a living will a legal document? A living will is a legal document. Each state has its own laws about living prado. If you move to another state, make sure that your living will is legal in the state where you now live. Or you might use a universal form that has been approved by many states. This kind of form can sometimes be completed and stored online. Your electronic copy will then be available wherever you have a connection to the Internet. In most cases, doctors will respect your wishes even if you have a form from a different state. · You don't need an  to complete a living will. But legal advice can be helpful if your state's laws are unclear, your health history is complicated, or your family can't agree on what should be in your living will. · You can change your living will at any time.  Some people find that their wishes about end-of-life care change as their health changes. · In addition to making a living will, think about completing a medical power of  form. This form lets you name the person you want to make end-of-life treatment decisions for you (your \"health care agent\") if you're not able to. Many hospitals and nursing homes will give you the forms you need to complete a living will and a medical power of . · Your living will is used only if you can't make or communicate decisions for yourself anymore. If you become able to make decisions again, you can accept or refuse any treatment, no matter what you wrote in your living will. · Your state may offer an online registry. This is a place where you can store your living will online so the doctors and nurses who need to treat you can find it right away. What should you think about when creating a living will? Talk about your end-of-life wishes with your family members and your doctor. Let them know what you want. That way the people making decisions for you won't be surprised by your choices. Think about these questions as you make your living will: · Do you know enough about life support methods that might be used? If not, talk to your doctor so you know what might be done if you can't breathe on your own, your heart stops, or you're unable to swallow. · What things would you still want to be able to do after you receive life-support methods? Would you want to be able to walk? To speak? To eat on your own? To live without the help of machines? · If you have a choice, where do you want to be cared for? In your home? At a hospital or nursing home? · Do you want certain Episcopalian practices performed if you become very ill? · If you have a choice at the end of your life, where would you prefer to die? At home? In a hospital or nursing home? Somewhere else? · Would you prefer to be buried or cremated? · Do you want your organs to be donated after you die? What should you do with your living will? · Make sure that your family members and your health care agent have copies of your living will. · Give your doctor a copy of your living will to keep in your medical record. If you have more than one doctor, make sure that each one has a copy. · You may want to put a copy of your living will where it can be easily found. Where can you learn more? Go to http://rogelio-asa.info/. Enter X780 in the search box to learn more about \"Learning About Living Perroy. \" Current as of: September 24, 2016 Content Version: 11.4 © 0718-5425 [employer]. Care instructions adapted under license by [employer] (which disclaims liability or warranty for this information). If you have questions about a medical condition or this instruction, always ask your healthcare professional. [address] any warranty or liability for your use of this information. Introducing [facility]! Dear Margo Sánchez: Thank you for requesting a [employer] account. Our records indicate that you have previously registered for a [employer] account but its currently inactive. Please call our [employer] support line at [phone]. Additional Information If you have questions, please visit the Frequently Asked Questions section of the [employer] website at [email or web address] Remember, MyChart is NOT to be used for urgent needs. For medical emergencies, dial 911. Now available from your iPhone and Android! Please provide this summary of care documentation to your next provider. Your primary care clinician is listed as Pedro Moreno. If you have any questions after today's visit, please call [phone].

## 2018-01-29 NOTE — PROGRESS NOTES
[facility] NEUROLOGY [facility]. [address]   [place] [address]   Nikunj Phipps [facility] [place]    [facility]   [phone] Fax               Chief Complaint   Patient presents with    Memory Loss     follow up     Current Outpatient Prescriptions   Medication Sig Dispense Refill    hydroCHLOROthiazide (MICROZIDE) 12.5 mg capsule TAKE 1 CAPSULE DAILY 90 Cap 1    donepezil (ARICEPT) 10 mg tablet TAKE 1 TABLET DAILY 90 Tab 0    lisinopril (PRINIVIL, ZESTRIL) 20 mg tablet TAKE 1 TABLET DAILY 90 Tab 1    NAMENDA XR 28 mg capsule TAKE 1 CAPSULE DAILY 90 Cap 3    aspirin 81 mg tablet Take 81 mg by mouth daily. No Known Allergies  Social History   Substance Use Topics    Smoking status: Former Smoker     Quit date: 11/17/1975    Smokeless tobacco: Never Used    Alcohol use 7.0 oz/week     14 Glasses of wine per week     Patient returns today accompanied by her niece, Ms. Davin Red, for follow-up dementia of the Alzheimer's type. She continues to live over at [facility] and she continues to decline. Her niece notes that she has become more withdrawn. She is not participating in activities. She was found by the staff there not to be taking her medications properly. She was living or is living rather in independent living. The staff has been concerned that she is not able to do so. We will confused here at the office because [address] asking us for an order to have formal neuropsychological evaluation performed by Dr. Yuval Crabtree. We had no reason to have such testing performed because we had already had testing done in the past with Dr. Uli Gloria and we are treating her for Alzheimer's. Her niece explains to me that Nelda Paul is not aware that the patient is being treated for dementia.   She notes that she does not feel that this her place to tell them her medical history and from our standpoint, Nelda Paul is not on herHIPPA form and therefore we did not disclose that either. Her niece notes that she would prefer [facility] find this out on their own and then make whatever recommendation they need to make regarding changing her living situation. At [facility] she is able to move through the levels of care as needed. There is behaviors. The patient tells me she thinks that she is fine. She tells me that she will eat breakfast with some people but she typically eats dinner alone. She tells me that she goes to activities very frequently but her niece tells me that typically she just sits in her apartment alone. She has lost weight. Her niece also indicates that the dining aldana bill from [facility] does demonstrate that she has been missing meals. She is more forgetful. She forgets that she has appointments. She called her niece today and asked her if she was going to be picking her up at 7 AM or 7 PM for her appointment today but her appointment he  in the late afternoon. Review of systems  Not deemed accurate due to the patient's dementia    Examination  Visit Vitals    /76    Pulse 85    Resp 17    Ht 5' 7\" (1.702 m)    Wt 46.3 kg (102 lb)    SpO2 99%    BMI 15.98 kg/m2     She is a pleasant elderly lady. She is quite appropriately dressed and appropriately groomed. She cannot answer orientation questions. She is able to identify her knees. She has difficulty in identifying the family relationship however and has trouble identifying who her niece's mother was and that is quite distressful secondary to the fact that the niece's mother raised Mrs. Maria Luisa Blanc after Mrs. Bernal's mother passed. She has no nystagmus. Full versions. No pronation or drift. 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 Ambulates steadily. Impression/Plan  Advancing dementia the Alzheimer's type. Discussed this today with her niece. Her niece realizes this. She also realizes she needs increased level of care and she is comfortable that [facility] can provide that.   She just wants to try to maintain Ms. Chance Servin is dignity so to speak and she also does not want to have to be the one to tell Mrs. Chance Servin that she has to move into an assisted type living situation or worse yet the memory care unit. She is willing to let Kristie decide what level she needs based upon that testing with Dr. Edu Lubin. I told her I would [facility] now that I understand the situation. We will continue the Aricept and Namenda. Try to keep her as active as possible in all spheres. Follow in 6 months. Yolanda Diaz MD    Total time: 25 min   Counseling / coordination time: 20 min   > 50% counseling / coordination?: Yes re: as documented above      This note was created using voice recognition software. Despite editing, there may be syntax errors. This note will not be viewable in [address]

## 2018-03-14 ENCOUNTER — OFFICE VISIT (OUTPATIENT)
Dept: CARDIOLOGY CLINIC | Age: 83
End: 2018-03-14

## 2018-03-14 DIAGNOSIS — Z95.0 CARDIAC PACEMAKER IN SITU: Primary | ICD-10-CM

## 2018-04-04 ENCOUNTER — TELEPHONE (OUTPATIENT)
Dept: INTERNAL MEDICINE CLINIC | Age: 83
End: 2018-04-04

## 2018-04-04 NOTE — TELEPHONE ENCOUNTER
Tonya with [employer] ( [phone] ext [phone]) also use as case number.) called to verify receipt of \"Diability forms\"  Faxed on 04/03/18 at 1:24pm to Dr. Gail Toussaint.  Best contact  [phone]              FROM ANSWERING SERVICE

## 2018-04-05 ENCOUNTER — TELEPHONE (OUTPATIENT)
Dept: INTERNAL MEDICINE CLINIC | Age: 83
End: 2018-04-05

## 2018-04-05 NOTE — TELEPHONE ENCOUNTER
[facility]  [id number] [facility]-[phone]  Please call Janel Ulloa in re to ADL Form that was faxed yesterday

## 2018-04-05 NOTE — TELEPHONE ENCOUNTER
Writer left voicemail with Christophe Demarco stating that we have not received these forms, informed Christophe Demarco to fax over forms to fax number provided [phone]. attn Dr Wild Corrigan

## 2018-04-06 ENCOUNTER — TELEPHONE (OUTPATIENT)
Dept: NEUROLOGY | Age: 83
End: 2018-04-06

## 2018-04-06 NOTE — TELEPHONE ENCOUNTER
----- Message from Toma Hernandez sent at 4/6/2018 11:04 AM EDT -----  Regarding: Dr. Janice Cantu with [employer] would like a call back to confirm that the paperwork that was sent on 4/3/18 has been received. Aldenleticia Wu can be reached at [address] [facility], ext [phone].

## 2018-04-10 NOTE — TELEPHONE ENCOUNTER
----- Message from [facility] sent at 4/10/2018 11:49 AM EDT -----  Regarding: Dr. Viet Montiel telephone  Leonel Field, from [facility], called to get a status update on the form faxed over for the pt's disability on 4/3. Best contact number is [phone] Ext. [phone] ; [phone] Attention [phone].

## 2018-04-12 ENCOUNTER — TELEPHONE (OUTPATIENT)
Dept: INTERNAL MEDICINE CLINIC | Age: 83
End: 2018-04-12

## 2018-04-12 NOTE — TELEPHONE ENCOUNTER
----- Message from Jabier Toro sent at 4/11/2018  4:24 PM EDT -----  Regarding: Dr. Genaro Navarrete with [employer] is requesting a call back for a status update on the congnitive history form that was faxed to the practice.  Best contact number is [phone] [facility]#[phone]

## 2018-04-12 NOTE — TELEPHONE ENCOUNTER
Tonya with [employer] will like confirmation form sent on 4/3/18 and 4/6/18 for [employer] has been complete. Best Contact [phone] Ext. [phone].       From answering service

## 2018-04-16 NOTE — TELEPHONE ENCOUNTER
Form received. However, Dr Joya Tanner does not know the patient's bathing, grooming, feeding, or toileting habits. [employer] notified.

## 2018-04-17 ENCOUNTER — TELEPHONE (OUTPATIENT)
Dept: NEUROLOGY | Age: 83
End: 2018-04-17

## 2018-04-17 NOTE — TELEPHONE ENCOUNTER
----- Message from Camila Simpson sent at 4/17/2018 11:04 AM EDT -----  Regarding: Dr Scott Felix from [address] [place] [address] [phone], extn [phone],[facility] sent over a cognitive form over twice  Once on 4/3 and again on 4/12 and would like to know the status and  if Dr Harvey Bro can fill it out  And fax back

## 2018-04-23 ENCOUNTER — TELEPHONE (OUTPATIENT)
Dept: NEUROLOGY | Age: 83
End: 2018-04-23

## 2018-04-23 NOTE — TELEPHONE ENCOUNTER
----- Message from Tadeo Nicholas sent at 4/23/2018 10:27 AM EDT -----  Regarding: /Nati Castaneda from [facility] called requesting a call back in regards to  paper work for the pt that were requested for cognitive medical history. [facility] state that paper work were faxed to a [employer] which charged her a fee, [facility] only needs the pt cognitive medical history form filled out not a medical record request Best contact number is [phone] extn [phone].

## 2018-04-24 NOTE — TELEPHONE ENCOUNTER
----- Message from Reilly Pena sent at 4/24/2018  1:53 PM EDT -----  Regarding: /Nati Castaneda from [employer] called to check the status of paperwork that was faxed to the office on 04/03/18. Temo Winkler stated that she never request medical records, she sent a cognitive request form wanted to know if  It can be filled out or not. Best contact number is [phone] ext. [phone].

## 2018-04-25 NOTE — TELEPHONE ENCOUNTER
----- Message from Thomas Canales sent at 4/25/2018  1:53 PM EDT -----  Regarding: Dr. Concha Laws from [address] [place] [address] called concerning the patients disability forms because they sent the medical records which wasn't requested she stated that she called previously called before and she didn't get a call back. Rep best contact number [phone] ext [phone].

## 2018-04-26 NOTE — TELEPHONE ENCOUNTER
Message given to supervisor as this company has been told numerous times provider will not complete forms. He send office visit notes.

## 2018-06-20 ENCOUNTER — OFFICE VISIT (OUTPATIENT)
Dept: CARDIOLOGY CLINIC | Age: 83
End: 2018-06-20

## 2018-06-20 DIAGNOSIS — Z95.0 CARDIAC PACEMAKER IN SITU: Primary | ICD-10-CM

## 2018-09-25 ENCOUNTER — OFFICE VISIT (OUTPATIENT)
Dept: CARDIOLOGY CLINIC | Age: 83
End: 2018-09-25

## 2018-09-25 ENCOUNTER — CLINICAL SUPPORT (OUTPATIENT)
Dept: CARDIOLOGY CLINIC | Age: 83
End: 2018-09-25

## 2018-09-25 VITALS
SYSTOLIC BLOOD PRESSURE: 140 MMHG | HEIGHT: 67 IN | BODY MASS INDEX: 16.64 KG/M2 | WEIGHT: 106 LBS | HEART RATE: 90 BPM | RESPIRATION RATE: 18 BRPM | DIASTOLIC BLOOD PRESSURE: 80 MMHG | OXYGEN SATURATION: 95 %

## 2018-09-25 DIAGNOSIS — R41.3 MEMORY LOSS: ICD-10-CM

## 2018-09-25 DIAGNOSIS — Z95.0 CARDIAC PACEMAKER IN SITU: Primary | ICD-10-CM

## 2018-09-25 DIAGNOSIS — I10 ESSENTIAL HYPERTENSION: Primary | ICD-10-CM

## 2018-09-25 DIAGNOSIS — Z95.0 S/P PLACEMENT OF CARDIAC PACEMAKER: ICD-10-CM

## 2018-09-25 NOTE — PROGRESS NOTES
HISTORY OF PRESENT ILLNESS  Cassandra Romano is a 80 y.o. female     SUMMARY:   Problem List  Date Reviewed: 9/25/2018          Codes Class Noted    Alzheimer's disease ICD-10-CM: G30.9, F02.80  ICD-9-CM: 331.0  4/26/2014        S/P placement of cardiac pacemaker ICD-10-CM: Z95.0  ICD-9-CM: V45.01  5/28/2013    Overview Addendum 3/30/2015  7:44 AM by Mena Mcbride MD     5/28/13 dual chamber Biotronik pacemaker  5/13 echo normal lvef, lae, mild mr, tr             Third degree atrioventricular block ([facility] [address] ICD-10-CM: I44.2  ICD-9-CM: 426.0  5/28/2013        H/O seasonal allergies ICD-10-CM: Z88.9  ICD-9-CM: V15.09  5/17/2013        Memory loss ICD-10-CM: R41.3  ICD-9-CM: 780.93  8/2/2012        Essential hypertension ICD-10-CM: I10  ICD-9-CM: 401.9  11/17/2011        Family history of malignant neoplasm of colon ICD-10-CM: Z80.0  ICD-9-CM: V16.0  11/17/2011              Current Outpatient Prescriptions on File Prior to Visit   Medication Sig    hydroCHLOROthiazide (MICROZIDE) 12.5 mg capsule TAKE 1 CAPSULE DAILY    donepezil (ARICEPT) 10 mg tablet TAKE 1 TABLET DAILY    lisinopril (PRINIVIL, ZESTRIL) 20 mg tablet TAKE 1 TABLET DAILY    NAMENDA XR 28 mg capsule TAKE 1 CAPSULE DAILY    aspirin 81 mg tablet Take 81 mg by mouth daily. No current facility-administered medications on file prior to visit. CARDIOLOGY STUDIES TO DATE:  5/28/13 dual chamber Biotronik pacemaker  5/13 echo normal lvef, lae, mild mr, tr          Chief Complaint   Patient presents with    Hypertension     HPI :  Ms. Sandeep Weir is doing fine from a cardiac perspective with no symptoms. She is still slowly losing weight. She has moved to a different area of [place] where she has more supervision and does not get out and walk as much as she used to. She had a pacemaker check today.           CARDIAC ROS:   negative for chest pain, dyspnea, syncope    Family History   Problem Relation Age of Onset    Cancer Mother  Heart Disease Mother     Hypertension Mother     Stroke Mother     Diabetes Father     Cancer Father     Heart Disease Father     Psychiatric Disorder Brother     Alcohol abuse Paternal Uncle        Past Medical History:   Diagnosis Date    Abnormal weight loss     Allergic rhinitis     Cancer ([address]     skin    Dementia 8/15/2013    Family history of malignant neoplasm of colon 11/17/2011    H/O seasonal allergies 5/17/2013    Hypertension     Memory disorder     Pacemaker        GENERAL ROS:  Review of systems not obtained due to patient factors. Visit Vitals    /80 (BP 1 Location: Left arm, BP Patient Position: Sitting)    Pulse 90    Resp 18    Ht 5' 7\" (1.702 m)    Wt 106 lb (48.1 kg)    SpO2 95%    BMI 16.6 kg/m2       Wt Readings from Last 3 Encounters:   09/25/18 106 lb (48.1 kg)   01/22/18 102 lb (46.3 kg)   08/29/17 112 lb 12.8 oz (51.2 kg)            BP Readings from Last 3 Encounters:   09/25/18 140/80   01/22/18 118/76   08/29/17 122/60       PHYSICAL EXAM  General appearance: alert, cooperative, no distress, appears stated age  Neck: supple, symmetrical, trachea midline, no adenopathy, no carotid bruit and no JVD  Lungs: clear to auscultation bilaterally  Heart: regular rate and rhythm, S1, S2 normal, no murmur, click, rub or gallop  Extremities: extremities normal, atraumatic, no cyanosis or edema    Lab Results   Component Value Date/Time    Cholesterol, total 226 (H) 11/28/2011 08:55 AM    HDL Cholesterol 75 11/28/2011 08:55 AM    LDL, calculated 132 (H) 11/28/2011 08:55 AM    Triglyceride 95 11/28/2011 08:55 AM     ASSESSMENT  Ms. Sujey Pool is stable, asymptomatic and well compensated on a good medical regimen and needs no cardiac testing at this time.           current treatment plan is effective, no change in therapy  lab results and schedule of future lab studies reviewed with patient  reviewed diet, exercise and weight control    Encounter Diagnoses   Name Primary?  Essential hypertension Yes    S/P placement of cardiac pacemaker     Memory loss      No orders of the defined types were placed in this encounter. Follow-up Disposition:  Return in about 1 year (around 9/25/2019).     Alexis Camp MD  9/25/2018

## 2018-09-25 NOTE — MR AVS SNAPSHOT
[address] [facility] [address] [place] 
[phone] Patient: Syl Max MRN: [id number] ZAS:[birth year] Visit Information Date & Time Provider Department Dept. Phone Encounter #  
 9/25/2018  2:20 PM Clive Cage MD [facility] [phone] [id number] Follow-up Instructions Return in about 1 year (around 9/25/2019). Your Appointments 9/25/2018  2:20 PM  
ESTABLISHED PATIENT with Clive Cage MD  
[facility] ([address]) Appt Note: one year follow up  
 [address] [facility] [address] [place] [address]  
[address] [facility] [phone] Upcoming Health Maintenance Date Due DTaP/Tdap/Td series (1 - Tdap) 7/3/1954 Shingrix Vaccine Age 50> (1 of 2) 7/3/1983 Pneumococcal 65+ Low/Medium Risk (2 of 2 - PPSV23) 1/2/2005 GLAUCOMA SCREENING Q2Y 9/1/2015 MEDICARE YEARLY EXAM 3/14/2018 Influenza Age 5 to Adult 8/1/2018 Allergies as of 9/25/2018  Review Complete On: 9/25/2018 By: Clive Cage MD  
 No Known Allergies Current Immunizations  Reviewed on 2/27/2014 Name Date Influenza Vaccine Split 11/17/2011 TB Skin Test (PPD) Intradermal 12/16/2013 ZZZ-RETIRED (DO NOT USE) Pneumococcal Vaccine (Unspecified Type) 1/2/2000 Zoster 1/2/2010 Not reviewed this visit You Were Diagnosed With   
  
 Codes Comments Essential hypertension    -  Primary ICD-10-CM: I10 
ICD-9-CM: 401.9 S/P placement of cardiac pacemaker     ICD-10-CM: Z95.0 ICD-9-CM: V45.01 Memory loss     ICD-10-CM: R41.3 ICD-9-CM: 780.93 Vitals BP Pulse Resp Height(growth percentile) Weight(growth percentile) SpO2  
 140/80 (BP 1 Location: Left arm, BP Patient Position: Sitting) 90 18 5' 7\" (1.702 m) 106 lb (48.1 kg) 95% BMI OB Status Smoking Status 16.6 kg/m2 Postmenopausal Former Smoker Vitals History BMI and BSA Data Body Mass Index Body Surface Area  
 16.6 kg/m 2 1.51 m 2 Preferred Pharmacy Pharmacy Name Phone Lucio Dudley, [facility] [phone] Your Updated Medication List  
  
   
This list is accurate as of 9/25/18  2:08 PM.  Always use your most recent med list.  
  
  
  
  
 aspirin 81 mg tablet Take 81 mg by mouth daily. donepezil 10 mg tablet Commonly known as:  ARICEPT  
TAKE 1 TABLET DAILY  
  
 hydroCHLOROthiazide 12.5 mg capsule Commonly known as:  Saddie Tavernier TAKE 1 CAPSULE DAILY  
  
 lisinopril 20 mg tablet Commonly known as:  PRINIVIL, ZESTRIL  
TAKE 1 TABLET DAILY  
  
 NAMENDA XR 28 mg capsule Generic drug:  memantine ER  
TAKE 1 CAPSULE DAILY Follow-up Instructions Return in about 1 year (around 9/25/2019). Introducing [facility]! [employer] introduces [employer] patient portal. Now you can access parts of your medical record, email your doctor's office, and request medication refills online. 1. In your internet browser, go to [email or web address] 2. Click on the First Time User? Click Here link in the Sign In box. You will see the New Member Sign Up page. 3. Enter your [employer] Access Code exactly as it appears below. You will not need to use this code after youve completed the sign-up process. If you do not sign up before the expiration date, you must request a new code. · [employer] Access Code: [email or web address] Expires: 12/24/2018  2:08 PM 
 
4. Enter the last four digits of your Social Security Number (xxxx) and Date of Birth (mm/dd/yyyy) as indicated and click Submit. You will be taken to the next sign-up page. 5. Create a [employer] ID. This will be your [employer] login ID and cannot be changed, so think of one that is secure and easy to remember. 6. Create a [employer] password. You can change your password at any time. 7. Enter your Password Reset Question and Answer. This can be used at a later time if you forget your password. 8. Enter your e-mail address. You will receive e-mail notification when new information is available in [address] 9. Click Sign Up. You can now view and download portions of your medical record. 10. Click the Download Summary menu link to download a portable copy of your medical information. If you have questions, please visit the Frequently Asked Questions section of the [employer] website. Remember, [employer] is NOT to be used for urgent needs. For medical emergencies, dial 911. Now available from your iPhone and Android! Please provide this summary of care documentation to your next provider. Your primary care clinician is listed as Gemma Winkler. If you have any questions after today's visit, please call [phone].

## 2018-12-24 ENCOUNTER — OFFICE VISIT (OUTPATIENT)
Dept: CARDIOLOGY CLINIC | Age: 83
End: 2018-12-24

## 2018-12-24 DIAGNOSIS — Z95.0 CARDIAC PACEMAKER IN SITU: Primary | ICD-10-CM

## 2019-04-03 ENCOUNTER — OFFICE VISIT (OUTPATIENT)
Dept: CARDIOLOGY CLINIC | Age: 84
End: 2019-04-03

## 2019-04-03 DIAGNOSIS — Z95.0 CARDIAC PACEMAKER IN SITU: Primary | ICD-10-CM

## 2019-07-08 ENCOUNTER — OFFICE VISIT (OUTPATIENT)
Dept: CARDIOLOGY CLINIC | Age: 84
End: 2019-07-08

## 2019-07-08 DIAGNOSIS — Z95.0 CARDIAC PACEMAKER IN SITU: Primary | ICD-10-CM

## 2019-10-29 ENCOUNTER — OFFICE VISIT (OUTPATIENT)
Dept: CARDIOLOGY CLINIC | Age: 84
End: 2019-10-29

## 2019-10-29 ENCOUNTER — CLINICAL SUPPORT (OUTPATIENT)
Dept: CARDIOLOGY CLINIC | Age: 84
End: 2019-10-29

## 2019-10-29 VITALS
SYSTOLIC BLOOD PRESSURE: 118 MMHG | BODY MASS INDEX: 16.64 KG/M2 | WEIGHT: 106 LBS | DIASTOLIC BLOOD PRESSURE: 66 MMHG | HEART RATE: 78 BPM | OXYGEN SATURATION: 97 % | HEIGHT: 67 IN | RESPIRATION RATE: 17 BRPM

## 2019-10-29 DIAGNOSIS — R41.3 MEMORY LOSS: ICD-10-CM

## 2019-10-29 DIAGNOSIS — Z95.0 CARDIAC PACEMAKER IN SITU: Primary | ICD-10-CM

## 2019-10-29 DIAGNOSIS — I44.2 THIRD DEGREE ATRIOVENTRICULAR BLOCK (HCC): Primary | ICD-10-CM

## 2019-10-29 DIAGNOSIS — I10 ESSENTIAL HYPERTENSION: ICD-10-CM

## 2019-10-29 NOTE — PROGRESS NOTES
HISTORY OF PRESENT ILLNESS  Makayla Perrin is a 80 y.o. female     SUMMARY:   Problem List  Date Reviewed: 10/29/2019          Codes Class Noted    Alzheimer's disease ([facility] ICD-10-CM: G30.9, F02.80  ICD-9-CM: 331.0  4/26/2014        S/P placement of cardiac pacemaker ICD-10-CM: Z95.0  ICD-9-CM: V45.01  5/28/2013    Overview Addendum 3/30/2015  7:44 AM by Francesca Guerrero MD     5/28/13 dual chamber Biotronik pacemaker  5/13 echo normal lvef, lae, mild mr, tr             Third degree atrioventricular block ([facility] ICD-10-CM: I44.2  ICD-9-CM: 426.0  5/28/2013        H/O seasonal allergies ICD-10-CM: Z88.9  ICD-9-CM: V15.09  5/17/2013        Memory loss ICD-10-CM: R41.3  ICD-9-CM: 780.93  8/2/2012        Essential hypertension ICD-10-CM: I10  ICD-9-CM: 401.9  11/17/2011        Family history of malignant neoplasm of colon ICD-10-CM: Z80.0  ICD-9-CM: V16.0  11/17/2011              Current Outpatient Medications on File Prior to Visit   Medication Sig    hydroCHLOROthiazide (MICROZIDE) 12.5 mg capsule TAKE 1 CAPSULE DAILY    donepezil (ARICEPT) 10 mg tablet TAKE 1 TABLET DAILY    lisinopril (PRINIVIL, ZESTRIL) 20 mg tablet TAKE 1 TABLET DAILY    NAMENDA XR 28 mg capsule TAKE 1 CAPSULE DAILY    aspirin 81 mg tablet Take 81 mg by mouth daily. No current facility-administered medications on file prior to visit. CARDIOLOGY STUDIES TO DATE:  Pacemaker check 9/26/2013 showed proper functions, 99% , 7 PAT 2-6 seconds (asymptomatic)    Chief Complaint   Patient presents with    Irregular Heart Beat     HPI :  Ms. Pastor Mckeon is doing okay. She is becoming more forgetful and although she is ambulating with a walker, she is not getting any exercise. She is keeping up with her pacemaker checks and no problems with her medications.        CARDIAC ROS:   negative for chest pain, dyspnea, palpitations, syncope, orthopnea, paroxysmal nocturnal dyspnea, exertional chest pressure/discomfort, claudication, lower extremity edema    Family History   Problem Relation Age of Onset    Cancer Mother     Heart Disease Mother     Hypertension Mother     Stroke Mother     Diabetes Father     Cancer Father     Heart Disease Father     Psychiatric Disorder Brother     Alcohol abuse Paternal Uncle        Past Medical History:   Diagnosis Date    Abnormal weight loss     Allergic rhinitis     Cancer ([address]     skin    Dementia ([address] 8/15/2013    Family history of malignant neoplasm of colon 11/17/2011    H/O seasonal allergies 5/17/2013    Hypertension     Memory disorder     Pacemaker        GENERAL ROS:  Review of systems not obtained due to patient factors. Visit Vitals  /66 (BP 1 Location: Left arm, BP Patient Position: Sitting)   Pulse 78   Resp 17   Ht 5' 7\" (1.702 m)   Wt 106 lb (48.1 kg)   SpO2 97%   BMI 16.60 kg/m²       Wt Readings from Last 3 Encounters:   10/29/19 106 lb (48.1 kg)   09/25/18 106 lb (48.1 kg)   01/22/18 102 lb (46.3 kg)            BP Readings from Last 3 Encounters:   10/29/19 118/66   09/25/18 140/80   01/22/18 118/76       PHYSICAL EXAM  General appearance: alert, cooperative, no distress, appears stated age  Neurologic: Alert and oriented X 1  Neck: supple, symmetrical, trachea midline, no adenopathy, no carotid bruit and no JVD  Lungs: clear to auscultation bilaterally  Heart: regular rate and rhythm, S1, S2 normal, no murmur, click, rub or gallop  Extremities: extremities normal, atraumatic, no cyanosis or edema    Lab Results   Component Value Date/Time    Cholesterol, total 226 (H) 11/28/2011 08:55 AM    HDL Cholesterol 75 11/28/2011 08:55 AM    LDL, calculated 132 (H) 11/28/2011 08:55 AM    Triglyceride 95 11/28/2011 08:55 AM     ASSESSMENT  Ms. Pastor Mckeon is stable and asymptomatic, well compensated on a good medical regimen and needs no cardiac testing at this time.          current treatment plan is effective, no change in therapy  lab results and schedule of future lab studies reviewed with patient  reviewed diet, exercise and weight control    Encounter Diagnoses   Name Primary?  Third degree atrioventricular block ([facility] Yes    Essential hypertension     Memory loss      No orders of the defined types were placed in this encounter. Follow-up and Dispositions    · Return in about 1 year (around 10/29/2020).          Maksim Austin MD  10/29/2019

## 2020-06-03 ENCOUNTER — OFFICE VISIT (OUTPATIENT)
Dept: CARDIOLOGY CLINIC | Age: 85
End: 2020-06-03

## 2020-06-03 DIAGNOSIS — Z95.0 CARDIAC PACEMAKER IN SITU: Primary | ICD-10-CM

## 2020-09-09 ENCOUNTER — OFFICE VISIT (OUTPATIENT)
Dept: CARDIOLOGY CLINIC | Age: 85
End: 2020-09-09
Payer: MEDICARE

## 2020-09-09 DIAGNOSIS — Z95.0 CARDIAC PACEMAKER IN SITU: Primary | ICD-10-CM

## 2020-09-09 PROCEDURE — 93296 REM INTERROG EVL PM/IDS: CPT | Performed by: INTERNAL MEDICINE

## 2020-09-09 PROCEDURE — 93294 REM INTERROG EVL PM/LDLS PM: CPT | Performed by: INTERNAL MEDICINE

## 2020-12-14 ENCOUNTER — OFFICE VISIT (OUTPATIENT)
Dept: CARDIOLOGY CLINIC | Age: 85
End: 2020-12-14
Payer: MEDICARE

## 2020-12-14 DIAGNOSIS — Z95.0 CARDIAC PACEMAKER IN SITU: Primary | ICD-10-CM

## 2020-12-14 PROCEDURE — 93294 REM INTERROG EVL PM/LDLS PM: CPT | Performed by: INTERNAL MEDICINE

## 2020-12-14 PROCEDURE — 93296 REM INTERROG EVL PM/IDS: CPT | Performed by: INTERNAL MEDICINE

## 2020-12-14 NOTE — LETTER
12/14/2020 9:22 AM 
 
Ms. Viera Grippe [id number] [facility] [phone] After careful review of your remote check of your implated device on 64/35/11 I have concluded that your device is working properly. Your next: 
 * Automatic remote check ( from home) is scheduled for  3/17/21 If you have any questions, please call [facility] [address] at [phone]. Additional Comments: ________________________________________________ 
 
__________________________________________________________________ 
 
__________________________________________________________________ Regine Rasmussen MD [facility]

## 2021-05-16 ENCOUNTER — APPOINTMENT (OUTPATIENT)
Dept: GENERAL RADIOLOGY | Age: 86
End: 2021-05-16
Attending: EMERGENCY MEDICINE
Payer: MEDICARE

## 2021-05-16 ENCOUNTER — HOSPITAL ENCOUNTER (EMERGENCY)
Dept: CT IMAGING | Age: 86
Discharge: HOME | End: 2021-05-16
Attending: EMERGENCY MEDICINE
Payer: MEDICARE

## 2021-05-16 ENCOUNTER — HOSPITAL ENCOUNTER (EMERGENCY)
Age: 86
Discharge: HOME | End: 2021-05-16
Attending: EMERGENCY MEDICINE | Admitting: EMERGENCY MEDICINE
Payer: MEDICARE

## 2021-05-16 VITALS
WEIGHT: 110 LBS | DIASTOLIC BLOOD PRESSURE: 71 MMHG | BODY MASS INDEX: 17.23 KG/M2 | OXYGEN SATURATION: 100 % | TEMPERATURE: 98.6 F | SYSTOLIC BLOOD PRESSURE: 138 MMHG | HEART RATE: 68 BPM

## 2021-05-16 DIAGNOSIS — M47.816 ARTHRITIS OF LUMBAR SPINE: ICD-10-CM
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
## 2021-05-16 DIAGNOSIS — M54.50 MIDLINE LOW BACK PAIN WITHOUT SCIATICA, UNSPECIFIED CHRONICITY: ICD-10-CM

## 2021-05-16 PROCEDURE — 99284 EMERGENCY DEPT VISIT MOD MDM: CPT

## 2021-05-16 PROCEDURE — 73562 X-RAY EXAM OF KNEE 3: CPT

## 2021-05-16 PROCEDURE — 73502 X-RAY EXAM HIP UNI 2-3 VIEWS: CPT

## 2021-05-16 PROCEDURE — 72131 CT LUMBAR SPINE W/O DYE: CPT

## 2021-05-16 PROCEDURE — 72125 CT NECK SPINE W/O DYE: CPT

## 2021-05-16 PROCEDURE — 74011250637 HC RX REV CODE- 250/637: Performed by: EMERGENCY MEDICINE

## 2021-05-16 PROCEDURE — 70450 CT HEAD/BRAIN W/O DYE: CPT

## 2021-05-16 RX ORDER — ACETAMINOPHEN 500 MG
1000 TABLET ORAL ONCE
Status: COMPLETED | OUTPATIENT
Start: 2021-05-16 | End: 2021-05-16

## 2021-05-16 RX ADMIN — ACETAMINOPHEN 1000 MG: 500 TABLET ORAL at 08:44

## 2021-05-16 NOTE — PROGRESS NOTES
5/16/2021; 11:15 -     Date of previous inpatient admission/ ED visit? 5/28/2013    What brought the patient back to ED? Ground Level Fall with back pain    Did patient decline recommended services during last admission/ ED visit (if yes, what)? No    Has patient seen a provider since their last inpatient admission/ED visit (if yes, when)? Yes - patient is followed by MD at [facility]    PCP: First and Last name: Maxim Neely   Name of Practice: @ [facility]   Are you a current patient: Yes/No: yes   Approximate date of last visit:    SHAMA Interventions:  From previous inpatient admission/ED visit: N/A  From current inpatient admission/ED visit  CM met with patient's cousin Lorenzo Sloan) at bedside, with patient resting comfortably. Patient has been a resident of [facility] for 5 years, and a resident of [facility] for 3 years. Patient has been in the Healthcare Unit at [facility] about 3 years ago. DME includes: walker, raised toilet seat. Emergency contact is patient's cousin. Patient has been fully vaccinated with the Pfizer vaccine as of approximately 6 weeks ago. CM contacted [facility] (Lorie Early: [phone]) to notify that patient has been cleared for discharge. Facility can receive patient back today, 5/16. CM submitted transport request to [facility] ([facility]) phone [phone] via [employer]. Packet is at [facility]. Transport confirmed for 12:15. Nursing will need to call report to Lorie Early: [phone].     CRM: Mary Davila, MPH, [address] [place] [address]; Z: [phone]

## 2021-05-17 NOTE — ED PROVIDER NOTES
80-year-old female with a history of dementia, HTN, presents to the emergency department by EMS from her nursing facility after a ground-level fall. Reportedly a nursing assistant that was assisting her at her nursing facility turned around for a moment and then when she looked back saw that the patient had fallen. No reported  LOC, however uncertain if she hit her head since the fall she had was not completely witnessed by staff as her back was turned. She has complaints of low back pain since the fall. She was reportedly at her neurologic baseline with known dementia. On arrival she complains of low back pain as well as left knee pain. She has not taken any medication for her symptoms.       Fall         Past Medical History:   Diagnosis Date    Abnormal weight loss     Allergic rhinitis     Cancer ([facility]     skin    Dementia ([facility] [address] 8/15/2013    Family history of malignant neoplasm of colon 11/17/2011    H/O seasonal allergies 5/17/2013    Hypertension     Memory disorder     Pacemaker        Past Surgical History:   Procedure Laterality Date    ENDOSCOPY, COLON, DIAGNOSTIC  2007    neg    HX HEENT      tonsil    HX PACEMAKER  05/28/2013    INS PPM/ICD LED DUAL ONLY  5/28/2013         SKIN CARE AND SKIN CHECKS           Family History:   Problem Relation Age of Onset    Cancer Mother     Heart Disease Mother     Hypertension Mother     Stroke Mother     Diabetes Father     Cancer Father     Heart Disease Father     Psychiatric Disorder Brother     Alcohol abuse Paternal Uncle        Social History     Socioeconomic History    Marital status:      Spouse name: Not on file    Number of children: Not on file    Years of education: Not on file    Highest education level: Not on file   Occupational History    Not on file   Social Needs    Financial resource strain: Not on file    Food insecurity     Worry: Not on file     Inability: Not on file   [employer] needs Medical: Not on file     Non-medical: Not on file   Tobacco Use    Smoking status: Former Smoker     Quit date: 1975     Years since quittin.7    Smokeless tobacco: Never Used   Substance and Sexual Activity    Alcohol use: Yes     Alcohol/week: 11.7 standard drinks     Types: 14 Glasses of wine per week    Drug use: No    Sexual activity: Never   Lifestyle    Physical activity     Days per week: Not on file     Minutes per session: Not on file    Stress: Not on file   Relationships    Social connections     Talks on phone: Not on file     Gets together: Not on file     Attends Restorationist service: Not on file     Active member of club or organization: Not on file     Attends meetings of clubs or organizations: Not on file     Relationship status: Not on file    Intimate partner violence     Fear of current or ex partner: Not on file     Emotionally abused: Not on file     Physically abused: Not on file     Forced sexual activity: Not on file   Other Topics Concern    Not on file   Social History Narrative    Not on file         ALLERGIES: Patient has no known allergies. Review of Systems   Unable to perform ROS: Dementia       Vitals:    21 0834 21 0845 21 1000   BP: 129/74 (!) 142/68 138/71   Pulse: 60 70 68   Temp: 98.6 °F (37 °C)     SpO2: 100% 98% 100%   Weight: 49.9 kg (110 lb)              Physical Exam  Vitals signs and nursing note reviewed. Constitutional:       General: She is not in acute distress. Appearance: Normal appearance. She is well-developed. She is not diaphoretic. HENT:      Head: Normocephalic and atraumatic. Nose: Nose normal.   Eyes:      Extraocular Movements: Extraocular movements intact. Conjunctiva/sclera: Conjunctivae normal.      Pupils: Pupils are equal, round, and reactive to light. Neck:      Musculoskeletal: Neck supple. Cardiovascular:      Rate and Rhythm: Normal rate and regular rhythm.       Heart sounds: Normal heart sounds. Pulmonary:      Effort: Pulmonary effort is normal.      Breath sounds: Normal breath sounds. Abdominal:      General: There is no distension. Palpations: Abdomen is soft. Tenderness: There is no abdominal tenderness. Musculoskeletal:         General: Tenderness present. Right hip: She exhibits tenderness. Left hip: She exhibits tenderness. Left knee: She exhibits no swelling, no ecchymosis and no deformity. Tenderness found. Lumbar back: She exhibits decreased range of motion, tenderness and bony tenderness. She exhibits no deformity. Skin:     General: Skin is warm and dry. Neurological:      General: No focal deficit present. Mental Status: She is alert and oriented to person, place, and time. Cranial Nerves: No cranial nerve deficit. Sensory: No sensory deficit. Motor: No weakness. Coordination: Coordination normal.          MDM    80-year-old female presents with GL F from NH. Exam as above. X-rays of left knee and hips reviewed by myself and read by radiology showing no acute bony abnormalities. CT head shows no acute intracranial abnormalities, CT C-spine shows some degenerative changes but no acute bony abnormalities and similar findings noted on CT lumbar spine. She is given dose of Tylenol in the ED and had improvement in her symptoms. These reassuring results were discussed with the patient's family member at the bedside he stated understanding and agreement with plan for discharge back to her nursing facility given reassuring results. Recommend continued Tylenol dosing as needed for pain. .  Return precautions were given for worsening or concerns.     Procedures

## 2021-08-30 ENCOUNTER — TELEPHONE (OUTPATIENT)
Dept: CARDIOLOGY CLINIC | Age: 86
End: 2021-08-30

## 2021-08-30 ENCOUNTER — DOCUMENTATION ONLY (OUTPATIENT)
Dept: CARDIOLOGY CLINIC | Age: 86
End: 2021-08-30

## 2021-08-30 NOTE — TELEPHONE ENCOUNTER
Spoke to Ms. Viridiana Nguyen verified on HIPAA. Told her Dr. Janene Evangelista has ok'd remotes only for [facility] but that we haven't received a transmission from her device since 12/14/2020. She said the device has been plugged in. I will order her a new Home Monitoring box shipped to Ms. Viridiana Nguyen at:    [address], [address] [place] [address] Street    She said the folks at [facility] must be knocking the plug out of the wall. Will also schedule a remote in 2 weeks time to make sure it's connected and transmitting. Ms. Viridiana Nguyen asked us to also let her know in the future sooner if the device becomes disconnected. Explained we don't know until after 21 days and she verified understanding and had no further questions or concerns.

## 2021-08-30 NOTE — TELEPHONE ENCOUNTER
Spoke to [address] home Monitoring box was sent to them. ETA 5-7 business days. She will call the office when they receive it to make sure it is connected. ..

## 2021-08-30 NOTE — TELEPHONE ENCOUNTER
Patient's cousin calling the device clinic with questions, please advise                  [phone]

## 2021-08-31 NOTE — PROGRESS NOTES
Patient's daughter in law asked that pacemaker be checked remotely only because of the difficulty with transportation to the office    Future Appointments   Date Time Provider Sherif Valadez   9/15/2021  9:15 AM REMOTE1, SHAHIDA ANGELO AMB   12/29/2021  2:00 PM REMOTE1, SHAHIDA ANGELO AMB   4/4/2022  8:15 AM REMOTE1, SHAHIDA ANGELO AMB   7/13/2022  8:00 AM REMOTE1, SHAHIDA ANGELO AMB Returned call and confirmed medication dosage for Celexa.

## 2021-09-07 ENCOUNTER — OFFICE VISIT (OUTPATIENT)
Dept: CARDIOLOGY CLINIC | Age: 86
End: 2021-09-07
Payer: MEDICARE

## 2021-09-07 DIAGNOSIS — Z95.0 CARDIAC PACEMAKER IN SITU: Primary | ICD-10-CM

## 2021-09-07 PROCEDURE — 93296 REM INTERROG EVL PM/IDS: CPT | Performed by: INTERNAL MEDICINE

## 2021-11-19 ENCOUNTER — TELEPHONE (OUTPATIENT)
Dept: CARDIOLOGY CLINIC | Age: 86
End: 2021-11-19

## 2021-11-19 NOTE — TELEPHONE ENCOUNTER
Patient has passed away as of yesterday and her family members are trying to determine where the monitor device needs to go that she was wearing.       Rhys Tena [phone]

## 2021-11-19 NOTE — TELEPHONE ENCOUNTER
Returned Romario's call and expressed condolences. He will drop off the pt's Home monitoring remote box to us here in the office.

## 2023-05-20 RX ORDER — DONEPEZIL HYDROCHLORIDE 10 MG/1
1 TABLET, FILM COATED ORAL DAILY
COMMUNITY
Start: 2017-11-02

## 2023-05-20 RX ORDER — LISINOPRIL 20 MG/1
1 TABLET ORAL DAILY
COMMUNITY
Start: 2017-08-24

## 2023-05-20 RX ORDER — HYDROCHLOROTHIAZIDE 12.5 MG/1
1 CAPSULE, GELATIN COATED ORAL DAILY
COMMUNITY
Start: 2018-01-21

## 2023-05-20 RX ORDER — MEMANTINE HYDROCHLORIDE 28 MG/1
1 CAPSULE, EXTENDED RELEASE ORAL DAILY
COMMUNITY
Start: 2017-07-25